# Patient Record
Sex: MALE | Race: WHITE | NOT HISPANIC OR LATINO | Employment: OTHER | URBAN - METROPOLITAN AREA
[De-identification: names, ages, dates, MRNs, and addresses within clinical notes are randomized per-mention and may not be internally consistent; named-entity substitution may affect disease eponyms.]

---

## 2025-03-06 ENCOUNTER — OFFICE VISIT (OUTPATIENT)
Dept: URGENT CARE | Age: 75
End: 2025-03-06
Payer: MEDICARE

## 2025-03-06 ENCOUNTER — ANCILLARY PROCEDURE (OUTPATIENT)
Dept: URGENT CARE | Age: 75
End: 2025-03-06
Payer: MEDICARE

## 2025-03-06 VITALS
WEIGHT: 190 LBS | DIASTOLIC BLOOD PRESSURE: 82 MMHG | SYSTOLIC BLOOD PRESSURE: 184 MMHG | RESPIRATION RATE: 20 BRPM | BODY MASS INDEX: 23.62 KG/M2 | OXYGEN SATURATION: 98 % | HEART RATE: 61 BPM | HEIGHT: 75 IN | TEMPERATURE: 98.2 F

## 2025-03-06 DIAGNOSIS — T14.8XXA CRUSH INJURY: ICD-10-CM

## 2025-03-06 DIAGNOSIS — S61.412A LACERATION OF MULTIPLE SITES OF LEFT HAND AND FINGERS, INITIAL ENCOUNTER: ICD-10-CM

## 2025-03-06 DIAGNOSIS — S61.412A LACERATION OF MULTIPLE SITES OF LEFT HAND AND FINGERS, INITIAL ENCOUNTER: Primary | ICD-10-CM

## 2025-03-06 DIAGNOSIS — S61.219A LACERATION OF MULTIPLE SITES OF LEFT HAND AND FINGERS, INITIAL ENCOUNTER: Primary | ICD-10-CM

## 2025-03-06 DIAGNOSIS — S61.219A LACERATION OF MULTIPLE SITES OF LEFT HAND AND FINGERS, INITIAL ENCOUNTER: ICD-10-CM

## 2025-03-06 DIAGNOSIS — S62.631B OPEN DISPLACED FRACTURE OF DISTAL PHALANX OF LEFT INDEX FINGER, INITIAL ENCOUNTER: ICD-10-CM

## 2025-03-06 PROCEDURE — 90471 IMMUNIZATION ADMIN: CPT | Performed by: PHYSICIAN ASSISTANT

## 2025-03-06 PROCEDURE — 90715 TDAP VACCINE 7 YRS/> IM: CPT | Performed by: PHYSICIAN ASSISTANT

## 2025-03-06 PROCEDURE — 73130 X-RAY EXAM OF HAND: CPT | Mod: LEFT SIDE | Performed by: PHYSICIAN ASSISTANT

## 2025-03-06 RX ORDER — HYDROCODONE BITARTRATE AND ACETAMINOPHEN 5; 325 MG/1; MG/1
1 TABLET ORAL EVERY 4 HOURS PRN
Qty: 18 TABLET | Refills: 0 | Status: SHIPPED | OUTPATIENT
Start: 2025-03-06 | End: 2025-03-09

## 2025-03-06 RX ORDER — DOXYCYCLINE HYCLATE 100 MG
100 TABLET ORAL 2 TIMES DAILY
Qty: 10 TABLET | Refills: 0 | Status: SHIPPED | OUTPATIENT
Start: 2025-03-06 | End: 2025-03-11

## 2025-03-06 ASSESSMENT — PAIN SCALES - GENERAL: PAINLEVEL_OUTOF10: 5

## 2025-03-06 NOTE — PROGRESS NOTES
"Subjective   Patient ID: Feliciano Rachel is a 74 y.o. male who presents for Finger Laceration (On 3rd & 4th digit of left hand).  HPI  Patient presents for left hand injury.  Patient suffered what amounts to a fairly severe crush type injury of the left third and fourth fingers causing severe lacerations, bleeding, and discoloration.  Event occurred just prior to arrival.  The patient did cleanse the wounds and wrapped them up.  No other injuries as result of the event.  Patient is unsure of most recent tetanus prophylaxis.  No other complaints.    Review of Systems    Constitutional:  See HPI    Musculoskeletal: See HPI  Integumentary: See HPI  Neurologic:  Alert and oriented X4, No numbness, No tingling.    All other systems are negative     Objective     BP (!) 184/82   Pulse 61   Temp 36.8 °C (98.2 °F) (Oral)   Resp 20   Ht 1.905 m (6' 3\")   Wt 86.2 kg (190 lb)   SpO2 98%   BMI 23.75 kg/m²     Physical Exam    General:  Alert and oriented, No acute distress.    Eye:  Pupils are equal, round and reactive to light, Normal conjunctiva.    HENT:  Normocephalic,   Neck:  Supple    Respiratory: Respirations are non-labored   Musculoskeletal: Normal ROM and strength  Integumentary: Left fourth finger with severe blowout laceration exposing all deep tissue measuring 8 cm crossing diagonally from the medial nail across the pad and extending almost to the proximal interphalangeal joint; there is fairly large subungual hematoma; the nail is well affixed; gross sensory intact; similar injury of the left middle finger measuring 8 cm  Neurologic:  Alert, Oriented, Normal sensory, Cranial Nerves II-XII are grossly intact  Psychiatric:  Cooperative, Appropriate mood & affect.    Laceration Repair    Date/Time: 3/6/2025 7:35 PM    Performed by: Elvis Santiago PA-C  Authorized by: Elvis Santiago PA-C    Consent:     Consent obtained:  Verbal and written    Consent given by:  Patient    Risks discussed:  Infection and " pain  Anesthesia:     Anesthesia method:  Nerve block    Block needle gauge:  27 G    Block anesthetic:  Lidocaine 1% WITH epi    Block outcome:  Anesthesia achieved  Laceration details:     Location:  Finger    Finger location:  L long finger    Wound length (cm): 8.    Laceration depth: full thickness.  Pre-procedure details:     Preparation:  Patient was prepped and draped in usual sterile fashion  Treatment:     Area cleansed with:  Chlorhexidine    Amount of cleaning:  Standard  Skin repair:     Repair method:  Sutures    Suture size:  4-0    Suture material:  Nylon    Suture technique:  Vertical mattress, simple interrupted and running    Number of sutures: 5 vertical matress, 3 interrupted, 1 runner.  Approximation:     Approximation:  Close  Repair type:     Repair type:  Complex  Post-procedure details:     Dressing:  Antibiotic ointment and bulky dressing    Procedure completion:  Tolerated well, no immediate complications  Laceration Repair    Date/Time: 3/6/2025 7:38 PM    Performed by: Elvis Santiago PA-C  Authorized by: Elvis Santiago PA-C    Consent:     Consent obtained:  Verbal    Consent given by:  Patient    Risks discussed:  Infection and pain  Anesthesia:     Anesthesia method:  Nerve block    Block needle gauge:  27 G    Block anesthetic:  Lidocaine 1% WITH epi    Block outcome:  Anesthesia achieved  Laceration details:     Location:  Finger    Finger location:  L ring finger    Wound length (cm): 8.    Laceration depth: full thickness.  Pre-procedure details:     Preparation:  Patient was prepped and draped in usual sterile fashion  Treatment:     Area cleansed with:  Chlorhexidine    Amount of cleaning:  Standard  Skin repair:     Repair method:  Sutures    Suture size:  4-0    Suture material:  Nylon    Suture technique:  Vertical mattress    Number of sutures: 8.  Approximation:     Approximation:  Close  Repair type:     Repair type:  Complex  Post-procedure details:     Dressing:   Antibiotic ointment and bulky dressing    Procedure completion:  Tolerated well, no immediate complications        Assessment/Plan   Exam revealed 2 blowout type lacerations 1 on each of the left ring and middle fingers.  Good results with repair.  X-ray showed slightly displaced fracture of the distal left fourth phalanx.  Patient was placed in a splint.  This becomes an open fracture with this.  Prescriptions for doxycycline and Norco.  Referral to orthopedic surgery.  Tdap provided.  Wound care reviewed.  Suture removal in 7 to 10 days.  Patient's clinical presentation is otherwise unremarkable at this time. Patient is discharged with instructions to follow-up with primary care or seek emergency medical attention for worsening symptoms or any new concerns.  Problem List Items Addressed This Visit    None  Visit Diagnoses       Laceration of multiple sites of left hand and fingers, initial encounter    -  Primary    Relevant Medications    doxycycline (Vibra-Tabs) 100 mg tablet    HYDROcodone-acetaminophen (Norco) 5-325 mg tablet    Other Relevant Orders    XR hand left 3+ views (Completed)    Open displaced fracture of distal phalanx of left index finger, initial encounter        Relevant Medications    doxycycline (Vibra-Tabs) 100 mg tablet    HYDROcodone-acetaminophen (Norco) 5-325 mg tablet    Other Relevant Orders    XR hand left 3+ views (Completed)    Referral to Orthopaedic Surgery            Final diagnoses:   [S61.412A, S61.219A] Laceration of multiple sites of left hand and fingers, initial encounter   [K82.804G] Open displaced fracture of distal phalanx of left index finger, initial encounter

## 2025-03-07 ENCOUNTER — OFFICE VISIT (OUTPATIENT)
Dept: ORTHOPEDIC SURGERY | Facility: CLINIC | Age: 75
End: 2025-03-07
Payer: MEDICARE

## 2025-03-07 DIAGNOSIS — S62.635B DISPLACED FRACTURE OF DISTAL PHALANX OF LEFT RING FINGER, INITIAL ENCOUNTER FOR OPEN FRACTURE: Primary | ICD-10-CM

## 2025-03-07 NOTE — PROGRESS NOTES
Acute Injury New Patient Visit    HPI: Feliciano is a 74 y.o.male who presents today with new complaints of left long finger and ring finger injury.  He is left-hand dominant.  Last night, he was loading his lawnmower into the truck, he slipped forward, crushing the fingers and the handlebar of the lawnmower.  He had significant blowout injury of the ring finger with laceration at the long finger as well.  He was seen at the urgent care, closed the wound with suture, updated his tetanus, and started him on doxycycline.  He was also prescribed Norco.  He just picked up those prescriptions today, so for pain he has been using ibuprofen 800 mg.  He is still in considerable pain.  He is also placed in splints for his fingers.  His ring was not removed at the urgent care.    Plan: For this left ring finger open fracture as well as laceration to the long finger of the left hand, removed his ring, redress his wound and provided him with supplies for daily dressing change, provided him with AlumaFoam splints for both fingers, encouraged him to start his antibiotics and using the Norco for pain control, and will follow-up with my partner, Dr. Eduardo baca on Monday morning for evaluation for likely surgical intervention.  No fracture charges this will be managed by the surgeon going forward.  Additionally, discussed conservative treatment measures including rest, ice, elevation, compression, and over-the-counter analgesia as needed and as appropriate.  Risks of NSAID use, steroid use, and muscle relaxers discussed in depth and considered in light of medical comorbidities.  Patient, and parent/guardian as applicable, understand agree with plan.  Follow-up: With Dr. Eduardo baca this upcoming Monday morning  X-rays on follow-up: None      Assessment:   Problem List Items Addressed This Visit    None  Visit Diagnoses       Displaced fracture of distal phalanx of left ring finger, initial encounter for  open fracture    -  Primary            Diagnostics: Reviewed all relevant imaging including x-ray, MRI, CT, and US.      Procedure:  Procedures    Physical Exam:  GENERAL:  No obvious acute distress.  NEURO:  Distally neurovascularly intact.  Sensation intact to light touch.  Extremity: Exam of the left hand reveals significant laceration and dried blood over the distal aspect of the ring and long finger, satisfactorily close lacerations with sutures with no active bleeding or drainage.  Flexion and extension appear to be intact.  Distally neurovascularly intact on exam.    No orders of the defined types were placed in this encounter.     At the conclusion of the visit there were no further questions by the patient/family regarding their plan of care.  Patient was instructed to call or return with any issues, questions, or concerns regarding their injury and/or treatment plan described above.     03/07/25 at 12:29 PM - Michael Hamm,     Office: [phone]    This note was prepared using voice recognition software.  The details of this note are correct and have been reviewed, and corrected to the best of my ability.  Some grammatical errors may persist related to the Dragon software.

## 2025-03-10 ENCOUNTER — APPOINTMENT (OUTPATIENT)
Dept: ORTHOPEDIC SURGERY | Facility: CLINIC | Age: 75
End: 2025-03-10
Payer: MEDICARE

## 2025-03-10 DIAGNOSIS — S62.631B OPEN DISPLACED FRACTURE OF DISTAL PHALANX OF LEFT INDEX FINGER, INITIAL ENCOUNTER: ICD-10-CM

## 2025-03-10 DIAGNOSIS — S61.412A LACERATION OF MULTIPLE SITES OF LEFT HAND AND FINGERS, INITIAL ENCOUNTER: ICD-10-CM

## 2025-03-10 DIAGNOSIS — S61.219A LACERATION OF MULTIPLE SITES OF LEFT HAND AND FINGERS, INITIAL ENCOUNTER: ICD-10-CM

## 2025-03-10 PROBLEM — S62.635B DISPLACED FRACTURE OF DISTAL PHALANX OF LEFT RING FINGER, INITIAL ENCOUNTER FOR OPEN FRACTURE: Status: ACTIVE | Noted: 2025-03-12

## 2025-03-10 PROBLEM — S61.213A LACERATION WITHOUT FOREIGN BODY OF LEFT MIDDLE FINGER WITHOUT DAMAGE TO NAIL, INITIAL ENCOUNTER: Status: ACTIVE | Noted: 2025-03-12

## 2025-03-10 PROCEDURE — 1159F MED LIST DOCD IN RCRD: CPT | Performed by: ORTHOPAEDIC SURGERY

## 2025-03-10 PROCEDURE — 99214 OFFICE O/P EST MOD 30 MIN: CPT | Performed by: ORTHOPAEDIC SURGERY

## 2025-03-10 PROCEDURE — 1036F TOBACCO NON-USER: CPT | Performed by: ORTHOPAEDIC SURGERY

## 2025-03-10 RX ORDER — HYDROCODONE BITARTRATE AND ACETAMINOPHEN 5; 325 MG/1; MG/1
1 TABLET ORAL EVERY 8 HOURS PRN
Qty: 15 TABLET | Refills: 0 | Status: SHIPPED | OUTPATIENT
Start: 2025-03-10 | End: 2025-03-15

## 2025-03-10 RX ORDER — DOXYCYCLINE HYCLATE 100 MG
100 TABLET ORAL 2 TIMES DAILY
Qty: 10 TABLET | Refills: 0 | Status: SHIPPED | OUTPATIENT
Start: 2025-03-10 | End: 2025-03-15

## 2025-03-10 NOTE — PROGRESS NOTES
3/10/2025    Chief Complaint   Patient presents with    Left Ring Finger - New Patient Visit, Fracture     Distal phalanx fx  DOI: 03/06/25  Xrdon @  03/06/25       History of Present Illness:  Patient Feliciano Rachel , 74 y.o. male, presents today, 3/10/2025, for evaluation of left middle finger and ring finger  injury .  This occurred 4 days ago, last Thursday, 3/6/2025.  He was lifting a lawnmower into the cab of his vehicle his hand slipped and became crushed under the handlebar.  He had blood injury to left ring and long finger.  He had immediate pain and bleeding.  He went to urgent care for evaluation.  The wounds were cleansed, sutures applied.  He was given prescription for doxycycline 100 mg twice daily for 5-day supply and his tetanus shot was updated.  He then saw one of her treating partners in the walk-in clinic that recommended hand surgeon referral.  He is here today to discuss his options.  He states that pain and discomfort are improving over the last couple days.  He denies any fevers, chills, constitutional symptoms.  He has been compliant with his antibiotic regiment.       Review of Systems:   GENERAL: Negative  GI: Negative  MUSCULOSKELETAL: See HPI  SKIN: Negative  NEURO:  Negative     Physical Exam:  GENERAL:  Alert and oriented to person, place, and time.  No acute distress and breathing comfortably; pleasant and cooperative with the examination.  HEENT:  Head is normocephalic and atraumatic.  NECK:  Supple, no visible swelling.  CARDIOVASCULAR:  No palpable tachycardia.  LUNGS:  No audible wheezing or labored breathing.  ABDOMEN:  Nondistended.  Extremities: Evaluation of left upper extremity finds the patient to have a palpable radial artery at the wrist with brisk capillary refill to all digits. The patient has intact sensorium to axillary, radial, median and ulnar nerves.  There are no signs of infection. There is no evidence of lymphedema or lymphatic streaking. The patient has  supple compartments of the left arm, forearm and hand.  Evidence of blowout wound/laceration to left long and ring finger closed with nylon suture.  There is evidence of component of subungual hematoma to both long and ring finger.  He has intact flexion extension across the proximal and distal interphalangeal joints.  No active bleeding.     Imaging/Test Results:  3 views of the left hand show evidence of minimally displaced left ring finger distal phalanx fracture.     Assessment:  Left long finger wound likely related to blood type injury with wound to the left ring finger and open distal phalanx fracture, 4 days out from injury.     Plan:  Treatment options were discussed including both operative and non-operative treatment strategies.  Patient elects to proceed forth with surgery by way of excisional debridement to left ring and long finger wounds with possible open treatment of fracture of left ring finger open distal phalanx fracture under digital block anesthesia to the affected digits.  Will plan for surgery this coming Wednesday, 3/12/2025.  Continue with doxycycline, we will renew this for an additional 5 days.  Risks, benefits, and alternatives to surgery were discussed including, but not limited to infection risk, persistent or incomplete relief of pain, swelling, or stiffness, and post-operative pain and discomfort.  The patient verbalized agreement and understanding of the plan for care.  All questions answered at today's visit.  Plan for follow-up 10-14 days post-op.    In a face to face encounter, I performed a history and physical examination, discussed pertinent diagnostic studies if indicated, and discussed diagnosis and management strategies with both the patient and the mid-level provider. I reviewed the mid-level's note and agree with the documented findings and plan of care.  Patient presents today status post crush/laceration to left ring and long fingers.  He was seen in the emergency  department where basic wound care was provided.  Nylon closure was performed.  The patient shows no overt signs of infection.  Complex lacerations about the tip of left long and ring finger.  Flexor and extensor tendons appear intact.  X-rays demonstrate comminuted fracture left ring finger tuft of distal phalanx.  Given zone of open injury this is consistent with clinical picture of open fracture left ring finger distal phalanx.  Treatment options were discussed including operative and nonoperative strategies.  Tetanus was updated.  He has been taking doxycycline.  We are going to extend the length of that dosing.  He elects to proceed forth with surgery by way of open exploration of the penetrating trauma with excisional debridement to all nonviable elements and specific excisional debridement to open fracture left ring finger distal phalanx with open treatment of left ring finger distal phalanx.  Plan for surgery under digital block anesthesia.  Patient is agreeable with this strategy.

## 2025-03-10 NOTE — DISCHARGE INSTRUCTIONS
Medication given may have significant effects after discharge. Therefore on the day of surgery:  1) You must be accompanied by a responsible adult upon discharge and for 24 hours after surgery.  Do not drive a motor vehicle, operate machinery, power tools or appliance, drink alcoholic beverages, or make critical decisions for 24 hours.  2) Be aware of dizziness, which may cause a fall. Change positions slowly.  3) Eating: you may resume your regular diet but it is better to increase intake slowly with mild foods and working up to your regular diet. No greasy, fried or spicy foods today.  4) Nausea/Vomiting: Nausea and vomiting may occur as you become more active or begin to increase food intake. If this should happen, decrease activity and return to liquids. If the problem persists, call your surgeon  5) Pain: Your surgeon may have given you a prescription for pain medication. Take pain medication with food as prescribed. Pain medication may cause constipation, so drink plenty of fluids. If your pain medication does not provide adequate relief, call your surgeon  6) Urinating: Notify your surgeon if you have not urinated within 12 hours after discharge  7) Ice: Apply ice to operative site for 20 min 5-6 times a day or use Polar care as instructed  8) Dressing:   []  Remove dressing in 3 Days   []  Leave open to air or apply simple Band-Aid after initial dressing is taken off and incision is dry. (If Steri-Strips are applied, leave them in place.)   []  No baths, hots tubs, pools, or submerging in fresh water sources. Okay to begin showering and normal hand washing after dressing removal.     [x]  Leave dressing in place. Keep dressing/ incision clean and dry.      9) Activity:    Shoulder/ Elbow/Hand:   [x]  Elevate extremity    []  Sling   [] At all times (except for exercises and showering)  [] As needed only for comfort   [] Begin daily motion exercises out of sling as instructed   [x]  Bend and flex  fingers/wrist/elbow frequently   [x] Non-weight bearing/No lifting/gripping/squeezing to the surgical limb   [] No lifting greater than 1 lb until follow-up visit      10) Begin physical therapy if advised by your physician:   [] Before returning to see you doctor    [x] Will discuss possible need at follow-up visit   [] Will be paired with your follow-up visit in [place]    11) Call your doctor at [phone] for an appointment (or follow up as scheduled)    Contact [facility] office if  Increased redness, swelling, drainage of any kind, and/or pain to surgery site.  As well as new onset fevers and or chills.  These could signify an infection.  Calf or thigh tenderness to touch as well as increased swelling or redness.  This could signify a clot formation.  Numbness or tingling to an area around the incision site or below the incision site (toes). Or if the operative extremity becomes cold, blue.  Any rash appears, increased  or new onset nausea/vomiting occur.  This may indicate a reaction to a medication.  Temp is 38.5 C (101F)  12) If you have any concerns or questions, please call [facility] surgeon on call. The 24- hour phone is [phone]  13) If you are unable to contact your surgeon, in an emergency situation, go to the nearest hospital

## 2025-03-12 ENCOUNTER — APPOINTMENT (OUTPATIENT)
Dept: RADIOLOGY | Facility: HOSPITAL | Age: 75
End: 2025-03-12
Payer: MEDICARE

## 2025-03-12 ENCOUNTER — ANESTHESIA (OUTPATIENT)
Dept: OPERATING ROOM | Facility: HOSPITAL | Age: 75
End: 2025-03-12
Payer: MEDICARE

## 2025-03-12 ENCOUNTER — ANESTHESIA EVENT (OUTPATIENT)
Dept: OPERATING ROOM | Facility: HOSPITAL | Age: 75
End: 2025-03-12
Payer: MEDICARE

## 2025-03-12 ENCOUNTER — HOSPITAL ENCOUNTER (OUTPATIENT)
Facility: HOSPITAL | Age: 75
Setting detail: OUTPATIENT SURGERY
Discharge: HOME | End: 2025-03-12
Attending: ORTHOPAEDIC SURGERY | Admitting: ORTHOPAEDIC SURGERY
Payer: MEDICARE

## 2025-03-12 VITALS
RESPIRATION RATE: 16 BRPM | HEIGHT: 75 IN | DIASTOLIC BLOOD PRESSURE: 72 MMHG | OXYGEN SATURATION: 99 % | SYSTOLIC BLOOD PRESSURE: 148 MMHG | HEART RATE: 72 BPM | TEMPERATURE: 97.3 F | WEIGHT: 190 LBS | BODY MASS INDEX: 23.62 KG/M2

## 2025-03-12 DIAGNOSIS — S62.635B DISPLACED FRACTURE OF DISTAL PHALANX OF LEFT RING FINGER, INITIAL ENCOUNTER FOR OPEN FRACTURE: Primary | ICD-10-CM

## 2025-03-12 DIAGNOSIS — G89.18 POST-OP PAIN: ICD-10-CM

## 2025-03-12 DIAGNOSIS — S61.213A LACERATION WITHOUT FOREIGN BODY OF LEFT MIDDLE FINGER WITHOUT DAMAGE TO NAIL, INITIAL ENCOUNTER: ICD-10-CM

## 2025-03-12 PROCEDURE — A26765 PR OPEN TX DISTAL PHALANGEAL FRACTURE EACH: Performed by: NURSE ANESTHETIST, CERTIFIED REGISTERED

## 2025-03-12 PROCEDURE — 99100 ANES PT EXTEME AGE<1 YR&>70: CPT | Performed by: ANESTHESIOLOGY

## 2025-03-12 PROCEDURE — A26765 PR OPEN TX DISTAL PHALANGEAL FRACTURE EACH: Performed by: ANESTHESIOLOGY

## 2025-03-12 PROCEDURE — 7100000010 HC PHASE TWO TIME - EACH INCREMENTAL 1 MINUTE: Performed by: ORTHOPAEDIC SURGERY

## 2025-03-12 PROCEDURE — 3700000002 HC GENERAL ANESTHESIA TIME - EACH INCREMENTAL 1 MINUTE: Performed by: ORTHOPAEDIC SURGERY

## 2025-03-12 PROCEDURE — 2500000004 HC RX 250 GENERAL PHARMACY W/ HCPCS (ALT 636 FOR OP/ED): Performed by: PHYSICIAN ASSISTANT

## 2025-03-12 PROCEDURE — 2500000004 HC RX 250 GENERAL PHARMACY W/ HCPCS (ALT 636 FOR OP/ED): Performed by: NURSE ANESTHETIST, CERTIFIED REGISTERED

## 2025-03-12 PROCEDURE — 2720000007 HC OR 272 NO HCPCS: Performed by: ORTHOPAEDIC SURGERY

## 2025-03-12 PROCEDURE — 3700000001 HC GENERAL ANESTHESIA TIME - INITIAL BASE CHARGE: Performed by: ORTHOPAEDIC SURGERY

## 2025-03-12 PROCEDURE — 7100000009 HC PHASE TWO TIME - INITIAL BASE CHARGE: Performed by: ORTHOPAEDIC SURGERY

## 2025-03-12 PROCEDURE — 3600000007 HC OR TIME - EACH INCREMENTAL 1 MINUTE - PROCEDURE LEVEL TWO: Performed by: ORTHOPAEDIC SURGERY

## 2025-03-12 PROCEDURE — 3600000002 HC OR TIME - INITIAL BASE CHARGE - PROCEDURE LEVEL TWO: Performed by: ORTHOPAEDIC SURGERY

## 2025-03-12 PROCEDURE — 2500000005 HC RX 250 GENERAL PHARMACY W/O HCPCS: Performed by: ORTHOPAEDIC SURGERY

## 2025-03-12 DEVICE — IMPLANTABLE DEVICE: Type: IMPLANTABLE DEVICE | Site: RING FINGER | Status: FUNCTIONAL

## 2025-03-12 RX ORDER — PROPOFOL 10 MG/ML
INJECTION, EMULSION INTRAVENOUS CONTINUOUS PRN
Status: DISCONTINUED | OUTPATIENT
Start: 2025-03-12 | End: 2025-03-12

## 2025-03-12 RX ORDER — SODIUM CHLORIDE 0.9 G/100ML
INJECTION, SOLUTION IRRIGATION AS NEEDED
Status: DISCONTINUED | OUTPATIENT
Start: 2025-03-12 | End: 2025-03-12 | Stop reason: HOSPADM

## 2025-03-12 RX ORDER — MELOXICAM 7.5 MG/1
7.5 TABLET ORAL DAILY
COMMUNITY

## 2025-03-12 RX ORDER — CEFAZOLIN SODIUM 2 G/100ML
2 INJECTION, SOLUTION INTRAVENOUS ONCE
Status: COMPLETED | OUTPATIENT
Start: 2025-03-12 | End: 2025-03-12

## 2025-03-12 RX ADMIN — CEFAZOLIN SODIUM 2 G: 2 INJECTION, SOLUTION INTRAVENOUS at 08:06

## 2025-03-12 RX ADMIN — SODIUM CHLORIDE, POTASSIUM CHLORIDE, SODIUM LACTATE AND CALCIUM CHLORIDE: 600; 310; 30; 20 INJECTION, SOLUTION INTRAVENOUS at 08:01

## 2025-03-12 SDOH — HEALTH STABILITY: MENTAL HEALTH: CURRENT SMOKER: 0

## 2025-03-12 ASSESSMENT — COLUMBIA-SUICIDE SEVERITY RATING SCALE - C-SSRS
2. HAVE YOU ACTUALLY HAD ANY THOUGHTS OF KILLING YOURSELF?: NO
1. IN THE PAST MONTH, HAVE YOU WISHED YOU WERE DEAD OR WISHED YOU COULD GO TO SLEEP AND NOT WAKE UP?: NO
6. HAVE YOU EVER DONE ANYTHING, STARTED TO DO ANYTHING, OR PREPARED TO DO ANYTHING TO END YOUR LIFE?: NO

## 2025-03-12 ASSESSMENT — PAIN - FUNCTIONAL ASSESSMENT
PAIN_FUNCTIONAL_ASSESSMENT: 0-10

## 2025-03-12 ASSESSMENT — PAIN SCALES - GENERAL
PAINLEVEL_OUTOF10: 0 - NO PAIN
PAINLEVEL_OUTOF10: 0 - NO PAIN

## 2025-03-12 NOTE — ANESTHESIA PREPROCEDURE EVALUATION
Patient: Feliciano Rachel    Procedure Information       Date/Time: 03/12/25 0812    Procedure: IRRIGATION AND DEBRIDEMENT LEFT MIDDLE FINGER AND LEFT RING FINGER WITH OPEN TREATMENT OF FRACTURE TO LEFT RING FINGER OPEN DISTAL PHALANX (Left: Fingers) - MAC + LOCAL WITH DIGITAL BLOCK X2 DONE BY ANESTHESIA    Location: [facility] / [facility]    Surgeons: Eduardo Tarango, DO            Relevant Problems   No relevant active problems       Clinical information reviewed:   Tobacco  Allergies  Meds   Med Hx  Surg Hx   Fam Hx  Soc Hx        NPO Detail:  NPO/Void Status  Carbohydrate Drink Given Prior to Surgery? : N  Date of Last Liquid: 03/12/25  Time of Last Liquid: 0530  Date of Last Solid: 03/11/25  Time of Last Solid: 1700  Last Intake Type: Clear fluids  Time of Last Void: 0649         Physical Exam    Airway  Mallampati: II  TM distance: >3 FB     Cardiovascular   Rhythm: regular  Rate: normal     Dental - normal exam     Pulmonary   Breath sounds clear to auscultation     Abdominal            Anesthesia Plan    History of general anesthesia?: yes  History of complications of general anesthesia?: no    ASA 2     MAC     The patient is not a current smoker.    intravenous induction   Anesthetic plan and risks discussed with patient.    Plan discussed with [facility].

## 2025-03-12 NOTE — OP NOTE
IRRIGATION AND DEBRIDEMENT LEFT MIDDLE FINGER AND LEFT RING FINGER WITH OPEN TREATMENT OF FRACTURE TO LEFT RING FINGER OPEN DISTAL PHALANX (L) Operative Note     Date: 3/12/2025  OR Location: [facility]    Name: Feliciano Rachel, : 1950, Age: 74 y.o., MRN: [id number], Sex: male        Preoperative diagnosis: Complex crush/laceration to left long and ring finger with open fracture left ring finger distal phalanx.  Left ring finger nailbed laceration.    Postoperative diagnosis: Same    Procedure planned: Exploration of penetrating trauma to left long finger with excisional debridement of nonviable skin subcutaneous tissue and digital nerve.  Excisional debridement to open fracture left ring finger distal phalanx.  Open reduction with internal fixation left ring finger distal phalanx fracture.  Left ring finger nailbed repair.    Procedure performed: Same    Surgeon: Eduardo Tarango D.O.    Assistant: POOJA Callaway  The physician assistant was present to the entire case. Given the nature of the disease process and the procedure to be performed a skilled surgical assistant was necessary during the case. The assistant was necessary in order to hold retractors and directly assist in the operation. A certified scrub tech was at the back table managing instruments and supplies for the surgical case.    Anesthesia: Digital block monitored by the anesthesia team    Estimated blood loss: Less than 10 cc    Drains: None    Tourniquet: Turnicot for less than 30 minutes to each digit    Specimens: None    Implants: None    Indications for procedure: The patient sustained complex crush/laceration to left long and ring finger near the tip.  Patient showed evidence for complex laceration.  There was clinical and radiographic evidence for open fracture of the left ring finger distal phalanx.  Treatment options were discussed including both operative and nonoperative strategies.  Recommendations made for open exploration of the  penetrating trauma to each digit with excisional debridement specific to ring finger including open fracture and repair of nailbed laceration with open treatment left ring finger distal phalanx fracture.  Patient was agreeable with this strategy.  Informed consent was signed and placed in the chart.    Complications: None noted at the time of surgery    Description of procedure: The patient was taken to the operative suite and placed in the supine position on the operating table.  A timeout was performed and left long and ring fingers were confirmed to be the operative site.  The patient was carefully positioned on the table in such a fashion as to pad all bony prominences and peripheral nerves.  The patient was administered appropriate IV antibiotics.  The digital block for working well.  Patient was prepped and draped in the normal sterile fashion.  Sutures placed in the emergency department were removed and discarded from the surgical field.  Turnicot's were placed.  Starting with a long finger the wounds were explored.  The patient was noted to have long oblique laceration starting about the dorsal radial aspect of the digit explanting proximally in an oblique fashion crossing the midline of the digit proximal to the distal interphalangeal flexion crease.  The long finger laceration carried down to the level of the flexor sheath.  There was partial digital nerve laceration distal to the trifurcation.  Excisional debridement was performed removing nonviable skin edges subcutaneous tissue and digital nerve branches.  There was no evidence for penetrating arthrotomy or flexor tendon laceration.  Attention was then turned to the ring finger.  Atraumatic techniques using the Quincy elevator device were used to remove the nail plate.  There was stellate laceration of the nailbed with obvious open fracture of distal phalanx.  The patient also was noted to have oblique laceration similar in pattern to what was seen to  the long finger extending from the distal aspect of the digit to the volar midline.  The wound however in this case was ulnarly based.  The wound was explored.  Again there was full-thickness injury carrying down to the level of the flexor tendon however no flexor tendon laceration or evidence for penetrating arthrotomy.  Excisional debridement was performed to the soft tissue envelope removing nonviable nailbed subcutaneous tissue fascia hematoma small particulate and skin.  Excisional debridement was performed using a fine curette removing small elements of bony particulate through zone of open fracture to the distal phalanx.  Copious irrigation was then performed irrigating each digit pink special attention to irrigate the open fracture of the distal phalanx.  Fluoroscopic imaging was then brought in.  Reduction was undertaken to the distal phalanx fracture of left ring finger.  While holding reduction force a 3 5 K wire was started at the tip of the distal phalanx and passed orthogonal to the plane of the fracture anchoring into the P3 base.  This pin was cut in a subcuticular position.  Final images were sent to the PACS system.  Additional irrigation was performed.  Chromic was used to close the nailbed laceration getting excellent coverage over the open fracture of the distal phalanx to ring finger.  Turnicot's were relieved.  Viability and hemostasis were confirmed.  Interrupted nylon stitches were used to close skin lacerations.  Soft dressing was placed to long finger.  Soft dressing and splint were placed to ring finger.  The patient was allowed to head to recovery in stable condition.  Overall the patient tolerated the procedure well.    Disposition: Stable to PACU        Eduardo Tarango  Phone Number: [phone]

## 2025-03-12 NOTE — ANESTHESIA POSTPROCEDURE EVALUATION
Patient: Feliciano Rachel    Procedure Summary       Date: 03/12/25 Room / Location: [facility] 03 / [facility]    Anesthesia Start:  Anesthesia Stop:     Procedure: IRRIGATION AND DEBRIDEMENT LEFT MIDDLE FINGER AND LEFT RING FINGER WITH OPEN TREATMENT OF FRACTURE TO LEFT RING FINGER OPEN DISTAL PHALANX (Left: Fingers) Diagnosis:       Laceration without foreign body of left middle finger without damage to nail, initial encounter      Displaced fracture of distal phalanx of left ring finger, initial encounter for open fracture      (Laceration without foreign body of left middle finger without damage to nail, initial encounter [S61.213A])      (Displaced fracture of distal phalanx of left ring finger, initial encounter for open fracture [S62.639J])    Surgeons: Eduardo Tarango DO Responsible Provider: Kenney Holguin MD    Anesthesia Type: MAC ASA Status: 2            Anesthesia Type: MAC    Vitals Value Taken Time   /72 03/12/25 0914   Temp 36.3 °C (97.3 °F) 03/12/25 0914   Pulse 72 03/12/25 0914   Resp 16 03/12/25 0914   SpO2 99 % 03/12/25 0914       Anesthesia Post Evaluation    Patient location during evaluation: PACU  Patient participation: complete - patient participated  Level of consciousness: awake and alert  Pain management: satisfactory to patient  Multimodal analgesia pain management approach  Airway patency: patent  Cardiovascular status: acceptable  Respiratory status: acceptable  Hydration status: acceptable  Postoperative Nausea and Vomiting: none        No notable events documented.

## 2025-03-24 ENCOUNTER — HOSPITAL ENCOUNTER (OUTPATIENT)
Dept: RADIOLOGY | Facility: HOSPITAL | Age: 75
Discharge: HOME | End: 2025-03-24
Payer: MEDICARE

## 2025-03-24 ENCOUNTER — APPOINTMENT (OUTPATIENT)
Dept: ORTHOPEDIC SURGERY | Facility: CLINIC | Age: 75
End: 2025-03-24
Payer: MEDICARE

## 2025-03-24 DIAGNOSIS — S62.633D OPEN DISPLACED FRACTURE OF DISTAL PHALANX OF LEFT MIDDLE FINGER WITH ROUTINE HEALING, SUBSEQUENT ENCOUNTER: ICD-10-CM

## 2025-03-24 DIAGNOSIS — S62.635B DISPLACED FRACTURE OF DISTAL PHALANX OF LEFT RING FINGER, INITIAL ENCOUNTER FOR OPEN FRACTURE: ICD-10-CM

## 2025-03-24 PROCEDURE — 73140 X-RAY EXAM OF FINGER(S): CPT | Mod: LT

## 2025-03-24 PROCEDURE — 1036F TOBACCO NON-USER: CPT | Performed by: ORTHOPAEDIC SURGERY

## 2025-03-24 PROCEDURE — 1159F MED LIST DOCD IN RCRD: CPT | Performed by: ORTHOPAEDIC SURGERY

## 2025-03-24 PROCEDURE — 99024 POSTOP FOLLOW-UP VISIT: CPT | Performed by: ORTHOPAEDIC SURGERY

## 2025-03-24 NOTE — PROGRESS NOTES
3/24/2025    Chief Complaint   Patient presents with    Left Hand - Post-op     I&D Lt ling finger and ring finger with open treatment of open distal phalanx fx  DOS: 3/12/25  Xrays today       History of Present Illness:  Patient Feliciano Rachel , 74 y.o. male, presents today, 3/24/2025, for evaluation of left middle finger and ring finger  open distal phalanx fractures, about 2 weeks out from excisional debridement and open treatment.  He has done well in the interim since surgery.  Completed prescribed course of oral antibiotics.  He is working on gentle motion recovery.  Denies any fevers, chills, constitutional symptoms.  Minimal soreness discomfort .         Review of Systems:   GENERAL: Negative  GI: Negative  MUSCULOSKELETAL: See HPI  SKIN: Negative  NEURO:  Negative     Physical Exam:  GENERAL:  Alert and oriented to person, place, and time.  No acute distress and breathing comfortably; pleasant and cooperative with the examination.  HEENT:  Head is normocephalic and atraumatic.  NECK:  Supple, no visible swelling.  CARDIOVASCULAR:  No palpable tachycardia.  LUNGS:  No audible wheezing or labored breathing.  ABDOMEN:  Nondistended.  Extremities: The surgical incision is clean, dry, intact, and appears to be healing well.  No active bleeding, erythema, warmth, drainage, or signs of infection.  Appropriate functional ROM demonstrated with flexion/extension of the digits, and flexion/extension/pronosupination of the wrist.  He demonstrates good range of motion of the digits with near full composite fist.  No extensor lag.     Imaging/Test Results:  3 views of the long and ring finger taken in the office today show good alignment throughout all 3 planes.  No evidence of hardware failure migration of indwelling K wire to the left ring finger.     Assessment:  Left long and ring finger open distal phalanx fractures, 2 weeks out and excisional debridement open treatment of fracture of the ring finger.      Plan:  Recommendations were made for suture removal.  This performed office today and tolerated well.  Continue with nonweightbearing left upper extremity.  Continue work range of motion recovery of the digits on his own.  Follow-up again in 2 weeks for repeat clinical and radiographic exam, x-rays 3 views the left long and ring finger upon return.  Likely remove pin from ring finger at that time.  All questions answered today's visit.  Of note, patient describes long history of bilateral hand cramping and weakness and pain with repetitive use.  We discussed this could be related to some ongoing carpal tunnel syndrome symptoms.  We discussed possibility of EMG in the future.  Will reevaluate at next visit.    Belén Davidson PA-C

## 2025-04-08 ENCOUNTER — APPOINTMENT (OUTPATIENT)
Dept: ORTHOPEDIC SURGERY | Facility: CLINIC | Age: 75
End: 2025-04-08
Payer: MEDICARE

## 2025-04-14 ENCOUNTER — APPOINTMENT (OUTPATIENT)
Dept: ORTHOPEDIC SURGERY | Facility: CLINIC | Age: 75
End: 2025-04-14
Payer: MEDICARE

## 2025-04-14 ENCOUNTER — HOSPITAL ENCOUNTER (OUTPATIENT)
Dept: RADIOLOGY | Facility: HOSPITAL | Age: 75
Discharge: HOME | End: 2025-04-14
Payer: MEDICARE

## 2025-04-14 DIAGNOSIS — S62.631B OPEN DISPLACED FRACTURE OF DISTAL PHALANX OF LEFT INDEX FINGER, INITIAL ENCOUNTER: ICD-10-CM

## 2025-04-14 DIAGNOSIS — S62.633D OPEN DISPLACED FRACTURE OF DISTAL PHALANX OF LEFT MIDDLE FINGER WITH ROUTINE HEALING, SUBSEQUENT ENCOUNTER: Primary | ICD-10-CM

## 2025-04-14 DIAGNOSIS — G56.03 BILATERAL CARPAL TUNNEL SYNDROME: ICD-10-CM

## 2025-04-14 PROCEDURE — 99214 OFFICE O/P EST MOD 30 MIN: CPT | Performed by: ORTHOPAEDIC SURGERY

## 2025-04-14 PROCEDURE — 73140 X-RAY EXAM OF FINGER(S): CPT | Mod: LT

## 2025-04-14 PROCEDURE — 99024 POSTOP FOLLOW-UP VISIT: CPT | Performed by: ORTHOPAEDIC SURGERY

## 2025-04-14 NOTE — PROGRESS NOTES
4/14/2025    Chief Complaint   Patient presents with    Left Hand - Follow-up     I&D Lt ling finger and ring finger with open treatment of open distal phalanx fx  DOS: 3/12/25  Xrays today       History of Present Illness:  Patient Feliciano Rachel , 74 y.o. male, presents today, 4/14/2025, for evaluation of left middle finger and ring finger  distal phalanx fractures, 4 weeks out from open treatment of fracture and excisional debridement.  He states he is feeling well in the interim since last visit.  The long finger he states feels completely normal the ring finger sensitized at the tip and he is wondering if this is due to indwelling pin.  No fevers, chills, constitutional symptoms to report .  At last visit he mentions some chronic bilateral hand pain cramping and numbness and tingling.  However, he states has been getting better the last few weeks.     Review of Systems:   GENERAL: Negative  GI: Negative  MUSCULOSKELETAL: See HPI  SKIN: Negative  NEURO:  Negative     Physical Exam:  GENERAL:  Alert and oriented to person, place, and time.  No acute distress and breathing comfortably; pleasant and cooperative with the examination.  HEENT:  Head is normocephalic and atraumatic.  NECK:  Supple, no visible swelling.  CARDIOVASCULAR:  No palpable tachycardia.  LUNGS:  No audible wheezing or labored breathing.  ABDOMEN:  Nondistended.  Extremities: Evaluation of left upper extremity finds the patient to have a palpable radial artery at the wrist with brisk capillary refill to all digits. The patient has intact sensorium to axillary, radial, median and ulnar nerves. There are no open wounds. There are no signs of infection. There is no evidence of lymphedema or lymphatic streaking. The patient has supple compartments of the left arm, forearm and hand.  Incisions and pin sites are all healing well.  He has good range of motion with full composite fist.  Positive Tinel's over the bilateral median nerves at the  wrist.     Imaging/Test Results:  3 views of the long and ring finger show evidence of healing open distal phalanx fractures with continued good alignment.  No evidence of hardware for migration of indwelling pin to the left ring finger.     Assessment:  Left long and ring finger open distal phalanx fractures, 4 weeks out from excisional debridement.  Suspected bilateral carpal tunnel syndrome.     Plan:  Recommendations are made for pin removal of the indwelling K wire to the left ring finger.  This was formed in office and tolerated well.  Sterile dressing was applied and patient structured to keep this intact for the next 24 hours then transition a simple Band-Aid dressing.  New U-shaped AlumaFoam splint to the ring finger and long finger provided and wean from these over the next weeks.  Follow-up in 6 weeks for repeat clinical exam, we will get x-rays 3 views of the left long and ring finger upon return.  We discussed possibility of carpal tunnel injection at that time if symptoms persist.  All questions answered today's visit.    In a face to face encounter, I performed a history and physical examination, discussed pertinent diagnostic studies if indicated, and discussed diagnosis and management strategies with both the patient and the mid-level provider. I reviewed the mid-level's note and agree with the documented findings and plan of care.  Patient presents today for evaluation of the left long and ring finger.  He describes symptoms compatible with bilateral carpal tunnel syndrome.  Positive Tinel's over course of median nerve to bilateral wrist.  Treatment options were discussed.  Fractures are healed.  Pin was removed.  He is going to do a 6-week follow-up.  He was offered diagnostic/therapeutic injections to the bilateral carpal tunnels today but wants to hold off.  6-week follow-up to discuss things again.  No x-rays upon return.

## 2025-06-09 ENCOUNTER — HOSPITAL ENCOUNTER (OUTPATIENT)
Dept: RADIOLOGY | Facility: HOSPITAL | Age: 75
Discharge: HOME | End: 2025-06-09
Payer: MEDICARE

## 2025-06-09 ENCOUNTER — APPOINTMENT (OUTPATIENT)
Dept: ORTHOPEDIC SURGERY | Facility: CLINIC | Age: 75
End: 2025-06-09
Payer: MEDICARE

## 2025-06-09 DIAGNOSIS — G56.03 BILATERAL CARPAL TUNNEL SYNDROME: Primary | ICD-10-CM

## 2025-06-09 DIAGNOSIS — S62.631B OPEN DISPLACED FRACTURE OF DISTAL PHALANX OF LEFT INDEX FINGER, INITIAL ENCOUNTER: ICD-10-CM

## 2025-06-09 DIAGNOSIS — S62.633D OPEN DISPLACED FRACTURE OF DISTAL PHALANX OF LEFT MIDDLE FINGER WITH ROUTINE HEALING, SUBSEQUENT ENCOUNTER: ICD-10-CM

## 2025-06-09 PROCEDURE — 99214 OFFICE O/P EST MOD 30 MIN: CPT | Performed by: ORTHOPAEDIC SURGERY

## 2025-06-09 PROCEDURE — 1159F MED LIST DOCD IN RCRD: CPT | Performed by: ORTHOPAEDIC SURGERY

## 2025-06-09 PROCEDURE — 20526 THER INJECTION CARP TUNNEL: CPT | Performed by: ORTHOPAEDIC SURGERY

## 2025-06-09 PROCEDURE — 1036F TOBACCO NON-USER: CPT | Performed by: ORTHOPAEDIC SURGERY

## 2025-06-09 PROCEDURE — 99024 POSTOP FOLLOW-UP VISIT: CPT | Performed by: ORTHOPAEDIC SURGERY

## 2025-06-09 PROCEDURE — 73140 X-RAY EXAM OF FINGER(S): CPT | Mod: LT

## 2025-06-09 RX ORDER — LIDOCAINE HYDROCHLORIDE 10 MG/ML
1 INJECTION, SOLUTION INFILTRATION; PERINEURAL
Status: COMPLETED | OUTPATIENT
Start: 2025-06-09 | End: 2025-06-09

## 2025-06-09 RX ORDER — TADALAFIL 20 MG/1
20 TABLET ORAL DAILY PRN
COMMUNITY
Start: 2025-05-29

## 2025-06-09 RX ADMIN — LIDOCAINE HYDROCHLORIDE 1 ML: 10 INJECTION, SOLUTION INFILTRATION; PERINEURAL at 08:35

## 2025-06-09 NOTE — PROGRESS NOTES
6/9/2025    Chief Complaint   Patient presents with    Left Hand - Post-op     I&D Lt long and ring finger with open treatment of open distal phalanx fx  DOS: 3/12/25  Xrays today        History of Present Illness:  Patient Feliciano Rachel , 74 y.o. male, presents today, 6/9/2025, for evaluation of left middle finger and ring finger distal phalanx fractures, about 3 months on excisional debridement and treatment of fracture.  He has done well since last visit.  No new injury or trauma.  He feels that overall the fingers are pain-free and he is able to functionally perform all activities he is wondering if the nail will have a more normal appearance over time.  He describes an occasional sensation of what he describes as swelling or slightly altered sensorium but clarifies this is not painful to the tip of the ring finger.  He feels this may be slightly improving over time.  Of note, he has persistence of numbness and tingling through median nerve distribution of the bilateral hands is continuing.  He has been thoughtfully considering carpal tunnel injections and wants to move forward with this today.  No new injury or trauma to report.         Review of Systems:   GENERAL: Negative  GI: Negative  MUSCULOSKELETAL: See HPI  SKIN: Negative  NEURO:  Negative     Physical Exam:  GENERAL:  Alert and oriented to person, place, and time.  No acute distress and breathing comfortably; pleasant and cooperative with the examination.  HEENT:  Head is normocephalic and atraumatic.  NECK:  Supple, no visible swelling.  CARDIOVASCULAR:  No palpable tachycardia.  LUNGS:  No audible wheezing or labored breathing.  ABDOMEN:  Nondistended.  Extremities: Evaluation of bilateral upper extremities finds the patient to have a palpable radial artery at the wrist with brisk capillary refill to all digits. The patient has intact sensorium to axillary, radial, median and ulnar nerves. There are no open wounds. There are no signs of  infection. There is no evidence of lymphedema or lymphatic streaking. The patient has supple compartments of the bilateral arms, forearms and hands.  Positive Tinel's over the median nerve at the bilateral wrist.  On the left he demonstrates full composite fist with no extensor lag rotational deformity digital range of motion.  He is nontender to palpation over the distal phalanxes to the long and ring finger.  Wounds are all well-healed.  The nail shows appropriate growth with slight wavy appearance but we discussed that this would likely remodel over time.     Imaging/Test Results:  Radiographs of the long and ring finger of the left hand show no acute fracture or dislocation.  There is evidence of healed distal phalanx fractures with good alignment and completeness of healing noted.     Assessment:  #1: Left long and ring finger open distal phalanx fractures, 3 months out from excisional debridement and open treatment of fracture doing well.  #2: Suspected bilateral carpal tunnel syndrome.     Plan:  Treatment options were reviewed at length.  We discussed that his altered sensorium and sensation of intermittent subjective swelling will likely continue to improve over time.  We discussed that nail plates will continue to remodel for even up to a year after initial injury.  Continue with weightbearing and activities as tolerated.  In regards to his developing carpal tunnel syndrome we recommend for bilateral diagnostic/therapeutic injection.  Patient was amenable to this.  This was performed in office today by Dr. Tarango and tolerated well.  Follow-up with our office in 4 weeks for repeat clinical exam, no x-rays necessary upon return.  All questions answered at today's visit.            Hand / UE Inj/Asp: R carpal tunnel for carpal tunnel syndrome on 6/9/2025 8:35 AM  Indications: diagnostic and therapeutic  Details: 25 G needle, volar approach  Medications: 10 mg triamcinolone acetonide 10 mg/mL; 1 mL lidocaine  10 mg/mL (1 %)  Outcome: tolerated well, no immediate complications    Right Carpal Tunnel Injection: It was explained to the patient that the risks of a steroid injection include but are not limited to infection, local skin irritation, skin atrophy, calcification, continued pain and discomfort, elevated blood sugar, burning, failure to relieve pain, and possible late infection. The patient verbalized good insight and verbalized consent for the injection. It was further explained that the post-injection discomfort can be alleviated with additional medications, ice, elevation, and rest over the first 24 hours, and that these modalities are recommended.  After informed consent was provided, patient identification was confirmed, and allergies were verified, the patient was appropriately positioned. The site was marked and time-out performed.    Using aseptic technique, a solution containing 1 mL of 10 mg of Kenalog and 1 mL of 1% lidocaine without epinephrine was injected into the patient's right carpal tunnel. A 25-gauge needle was inserted just ulnar to the anatomic course of the palmaris longus tendon at the level of the distal wrist flexion crease. It was slowly advanced deep to the distal antebrachial fascia. The solution was then injected slowly, taking care to ensure that the patient experienced no paresthesias during the injection of the solution. After injection of the solution, the patient was asked to perform active flexion and extension of the digits and wrist to help disperse the solution. A band-aid was then placed at the injection site. The patient tolerated this right carpal tunnel injection well.      Procedure, treatment alternatives, risks and benefits explained, specific risks discussed. Consent was given by the patient. Immediately prior to procedure a time out was called to verify the correct patient, procedure, equipment, support staff and site/side marked as required. Patient was prepped and  draped in the usual sterile fashion.       Hand / UE Inj/Asp: L carpal tunnel for carpal tunnel syndrome on 6/9/2025 8:35 AM  Indications: diagnostic and therapeutic  Details: 25 G needle, volar approach  Medications: 10 mg triamcinolone acetonide 10 mg/mL; 1 mL lidocaine 10 mg/mL (1 %)  Outcome: tolerated well, no immediate complications    Left Carpal Tunnel Injection: It was explained to the patient that the risks of a steroid injection include but are not limited to infection, local skin irritation, skin atrophy, calcification, continued pain and discomfort, elevated blood sugar, burning, failure to relieve pain, and possible late infection. The patient verbalized good insight and verbalized consent for the injection. It was further explained that the post-injection discomfort can be alleviated with additional medications, ice, elevation, and rest over the first 24 hours, and that these modalities are recommended.  After informed consent was provided, patient identification was confirmed, and allergies were verified, the patient was appropriately positioned. The site was marked and time-out performed.    Using aseptic technique, a solution containing 1 mL of 10 mg of Kenalog and 1 mL of 1% lidocaine without epinephrine was injected into the patient's left carpal tunnel. A 25-gauge needle was inserted just ulnar to the anatomic course of the palmaris longus tendon at the level of the distal wrist flexion crease. It was slowly advanced deep to the distal antebrachial fascia. The solution was then injected slowly, taking care to ensure that the patient experienced no paresthesias during the injection of the solution. After injection of the solution, the patient was asked to perform active flexion and extension of the digits and wrist to help disperse the solution. A band-aid was then placed at the injection site. The patient tolerated this left carpal tunnel injection well.      Procedure, treatment alternatives,  risks and benefits explained, specific risks discussed. Consent was given by the patient. Immediately prior to procedure a time out was called to verify the correct patient, procedure, equipment, support staff and site/side marked as required. Patient was prepped and draped in the usual sterile fashion.         In a face to face encounter, I performed a history and physical examination, discussed pertinent diagnostic studies if indicated, and discussed diagnosis and management strategies with both the patient and the mid-level provider. I reviewed the mid-level's note and agree with the documented findings and plan of care.  Patient presents today for evaluation of symptoms compatible with bilateral carpal tunnel syndrome.  Patient has done well status post treatment of left ring and long finger injuries.  Positive Tinel's over course of median nerve to bilateral wrist.  Treatment options were discussed regarding the management of the carpal tunnel syndrome.  We talked about operative and nonoperative strategies including intraoperative techniques and postoperative protocols.  Patient elects for steroid injection to the bilateral carpal tunnels today and a 4-week follow-up.  No x-rays upon return.

## 2025-07-14 ENCOUNTER — APPOINTMENT (OUTPATIENT)
Dept: ORTHOPEDIC SURGERY | Facility: CLINIC | Age: 75
End: 2025-07-14
Payer: MEDICARE

## 2025-07-28 ENCOUNTER — APPOINTMENT (OUTPATIENT)
Dept: ORTHOPEDIC SURGERY | Facility: CLINIC | Age: 75
End: 2025-07-28
Payer: MEDICARE

## 2025-07-28 DIAGNOSIS — G56.03 BILATERAL CARPAL TUNNEL SYNDROME: Primary | ICD-10-CM

## 2025-07-28 PROCEDURE — 99214 OFFICE O/P EST MOD 30 MIN: CPT | Performed by: ORTHOPAEDIC SURGERY

## 2025-07-28 NOTE — PROGRESS NOTES
"    7/28/2025    Chief Complaint   Patient presents with    Left Hand - Follow-up     I&D Lt long and ring finger with open treatment of open distal phalanx fx  DOS: 3/12/25  Bilateral CTS injs 6/9/25       Right Hand - Follow-up       History of Present Illness:  Patient Feliciano Rachel , 75 y.o. male, presents today, 7/28/2025, for evaluation of bilateral hand pain, numbness, and weakness.  Patient is about 2 months out from bilateral diagnostic/therapeutic carpal tunnel injections.  He states that his hands \"do not fall asleep anymore\", and that numbness and tingling has resolved.  He still endorses pain diffusely through the hands and a cramping sensation especially to the digits.  He still describes morning stiffness.  He is unsure if this is related to arthritic change or ongoing carpal tunnel.  He feels that his weakness is slightly improved.  Symptoms he states are worse on the right in comparison to the left although he is a left-hand-dominant individual.       Review of Systems:   GENERAL: Negative  GI: Negative  MUSCULOSKELETAL: See HPI  SKIN: Negative  NEURO:  Negative     Physical Exam:  GENERAL:  Alert and oriented to person, place, and time.  No acute distress and breathing comfortably; pleasant and cooperative with the examination.  HEENT:  Head is normocephalic and atraumatic.  NECK:  Supple, no visible swelling.  CARDIOVASCULAR:  No palpable tachycardia.  LUNGS:  No audible wheezing or labored breathing.  ABDOMEN:  Nondistended.  Extremities: Evaluation of bilateral upper extremities finds the patient to have a palpable radial artery at the wrist with brisk capillary refill to all digits. The patient has intact sensorium to axillary, radial, median and ulnar nerves. There are no open wounds. There are no signs of infection. There is no evidence of lymphedema or lymphatic streaking. The patient has supple compartments of the bilateral arms, forearms and hands.  Negative Tinel's over the median nerves " on exam today.  There is evidence of slight thenar atrophy noted, more so on the left in comparison of the right.     Imaging/Test Results:  None today.     Assessment:  Bilateral carpal tunnel syndrome, improved with injections, but likely incompletely resolved.     Plan:  Treatment options were reviewed including operative and nonoperative strategies.  Patient has preference for continued nonoperative management and simple observation at this time.  We discussed that some of his ongoing cramping sensation is likely related to lingering symptoms of carpal tunnel syndrome.  However, we did discuss that some of his pain could be related to chronic arthritic change.  We did offer him EMG which she declined today.  We did discuss possible surgical solutions including carpal tunnel release.  Ultimately, he elects for simple observation and follow-up on an as needed basis.  Will likely do anticipate return in the future for repeat discussion of carpal tunnel release at that time.  All questions answered at today's visit.    In a face to face encounter, I performed a history and physical examination, discussed pertinent diagnostic studies if indicated, and discussed diagnosis and management strategies with both the patient and the mid-level provider. I reviewed the mid-level's note and agree with the documented findings and plan of care.  Patient presents today for evaluation of the bilateral upper extremities.  He describes general improvement status post bilateral carpal tunnel injections but still describes cramping pain especially on the right.  Exam shows negative Tinel's over course of median nerve to bilateral wrist.  Treatment options were discussed including operative and nonoperative strategies.  Patient elects for a 6-week period of observation.  He understands that the cramping type pain could be related to the carpal tunnel syndrome or to a different diagnosis.  Certainly I do anticipate that the relief from  the bilateral carpal tunnel injections will wear off and ultimately we do recommend for carpal tunnel release either in the short-term or the future.  Patient elects for observation for now 6-week follow-up.  No x-rays upon return.  We talked about intraoperative techniques and postoperative protocols for carpal tunnel release.

## 2025-09-08 ENCOUNTER — APPOINTMENT (OUTPATIENT)
Dept: ORTHOPEDIC SURGERY | Facility: CLINIC | Age: 75
End: 2025-09-08
Payer: MEDICARE

## (undated) DEVICE — GLOVE, SURGICAL, PROTEXIS PI MICRO, 7.0, PF, LF

## (undated) DEVICE — SOLUTION, IRRIGATION, SODIUM CHLORIDE 0.9%, 1000 ML, POUR BOTTLE

## (undated) DEVICE — DRESSING, GAUZE, SUPER KERLIX, 6X6

## (undated) DEVICE — BANDAGE, COHESIVE, HAND TEAR, COFLEX, 2 X 5 YDS, LF

## (undated) DEVICE — SUTURE, ETHILON, 3-0, 18 IN, PS2, BLACK

## (undated) DEVICE — DRESSING, NON-ADHERENT, 3 X 3 IN, STERILE

## (undated) DEVICE — DRAPE, SHEET, 17 X 23 IN

## (undated) DEVICE — SOLUTION, IRRIGATION, STERILE WATER, 1000 ML, POUR BOTTLE

## (undated) DEVICE — SUTURE, ETHILON, 4-0, BLK, MONO, PS-2 18

## (undated) DEVICE — APPLICATOR, CHLORAPREP, W/ORANGE TINT, 26ML

## (undated) DEVICE — TOWEL PACK, STERILE, 4/PACK, BLUE

## (undated) DEVICE — Device

## (undated) DEVICE — GLOVE, SURGICAL, PROTEXIS PI MICRO, 7.5, PF, LF

## (undated) DEVICE — BANDAGE, GAUZE, 6 PLY, KERLIX, 4.5 IN X 4.1 YD, AMD, STERILE